# Patient Record
Sex: MALE | Race: WHITE | NOT HISPANIC OR LATINO | URBAN - METROPOLITAN AREA
[De-identification: names, ages, dates, MRNs, and addresses within clinical notes are randomized per-mention and may not be internally consistent; named-entity substitution may affect disease eponyms.]

---

## 2023-04-05 ENCOUNTER — EMERGENCY (EMERGENCY)
Facility: HOSPITAL | Age: 53
LOS: 0 days | Discharge: HOME | End: 2023-04-05
Attending: EMERGENCY MEDICINE
Payer: COMMERCIAL

## 2023-04-05 VITALS
DIASTOLIC BLOOD PRESSURE: 92 MMHG | OXYGEN SATURATION: 99 % | HEART RATE: 75 BPM | RESPIRATION RATE: 20 BRPM | HEIGHT: 70 IN | TEMPERATURE: 97 F | SYSTOLIC BLOOD PRESSURE: 131 MMHG | WEIGHT: 175.05 LBS

## 2023-04-05 DIAGNOSIS — M79.661 PAIN IN RIGHT LOWER LEG: ICD-10-CM

## 2023-04-05 DIAGNOSIS — M79.89 OTHER SPECIFIED SOFT TISSUE DISORDERS: ICD-10-CM

## 2023-04-05 PROCEDURE — 93970 EXTREMITY STUDY: CPT | Mod: 26

## 2023-04-05 PROCEDURE — 99284 EMERGENCY DEPT VISIT MOD MDM: CPT | Mod: 25

## 2023-04-05 PROCEDURE — 93970 EXTREMITY STUDY: CPT

## 2023-04-05 PROCEDURE — 99284 EMERGENCY DEPT VISIT MOD MDM: CPT

## 2023-04-05 NOTE — ED PROVIDER NOTE - CLINICAL SUMMARY MEDICAL DECISION MAKING FREE TEXT BOX
leg pain, vascular us obtained which did not reveal DVT. results discussed with patient and patient will fu with primary care physician as an outpatient.

## 2023-04-05 NOTE — ED PROVIDER NOTE - PATIENT PORTAL LINK FT
You can access the FollowMyHealth Patient Portal offered by [facility] by registering at the following website: [email or web address]. By joining [employer] [facility]’s FollowMyHealth portal, you will also be able to view your health information using other applications (apps) compatible with our system.

## 2023-04-05 NOTE — ED PROVIDER NOTE - OBJECTIVE STATEMENT
52-year-old male with no past medical history presented with right leg pain x1 week.  Pain is located in the back of his knee to the popliteal fossa region.  Denies any trauma.  No paresthesias or weakness.  No redness.  No fevers or chills.

## 2023-04-05 NOTE — ED PROVIDER NOTE - PHYSICAL EXAMINATION
VITAL SIGNS: I have reviewed nursing notes and confirm.  CONSTITUTIONAL: Patient is in no acute distress.  SKIN: Skin exam is warm and dry, no acute rash.  HEAD: Normocephalic; atraumatic.  EYES: PERRL, EOM intact; conjunctiva and sclera clear.  ENT: No nasal discharge; airway clear.   NECK: Supple; non tender.  CARD: S1, S2 normal; no murmurs, gallops, or rubs. Regular rate and rhythm.  RESP: Clear to auscultation bilaterally. No wheezes, rales or rhonchi.  ABD: Normal bowel sounds; soft; non-distended; non-tender.   EXT: Normal ROM. No edema. Mild tenderness to right popliteal fossa.  No calf tenderness.  Dorsalis pedis pulses 2+ bilaterally.  LYMPH: No acute cervical adenopathy.  NEURO: Alert, oriented. Grossly unremarkable. No focal deficits.  PSYCH: Cooperative, appropriate.

## 2023-04-05 NOTE — ED PROVIDER NOTE - ATTENDING APP SHARED VISIT CONTRIBUTION OF CARE
Right posterior leg pain with mild swelling no history of trauma occurring for 5 days no chest pain or shortness of breath on exam there is mild swelling noted to the posterior popliteal region distal pulses are intact cap refill is normal there is no discoloration Redd sign negative plan is to obtain DVT ultrasound

## 2024-02-20 ENCOUNTER — APPOINTMENT (OUTPATIENT)
Dept: ORTHOPEDIC SURGERY | Facility: CLINIC | Age: 54
End: 2024-02-20
Payer: COMMERCIAL

## 2024-02-20 PROBLEM — Z00.00 ENCOUNTER FOR PREVENTIVE HEALTH EXAMINATION: Status: ACTIVE | Noted: 2024-02-20

## 2024-02-20 PROCEDURE — 99203 OFFICE O/P NEW LOW 30 MIN: CPT | Mod: 25

## 2024-02-20 PROCEDURE — 20610 DRAIN/INJ JOINT/BURSA W/O US: CPT | Mod: LT

## 2024-02-20 PROCEDURE — 73562 X-RAY EXAM OF KNEE 3: CPT | Mod: 50

## 2024-02-27 NOTE — HISTORY OF PRESENT ILLNESS
[de-identified] : Patient here for evaluation left knee pain. Complains of joint line pain Positive mechanical symptoms and catching  NAD Left knee No skin breakdown Medial joint line ttp Positive isaías Negative lachman Negative varus/valgus instability ROM 0-130 Pain with forced extension and flexion NVI Compartments soft and NT  Xray reviewed and significant for left knee mild degenerative changes  mri left knee mmt, chondromalacia  plan i went over findings with pt explained the mri in detail op vs nonop explained r/b/a explained will try more cons tx for now due to pain, left knee injection fu in 6 weeks  Large Joint Injection was performed because of pain/rom. Anesthesia: ethyl chloride sprayed topically. Dexamethasone 2 cc of 4mg.   Lidocaine: 2 cc of 1%  Medication was injected in the left knee. Patient has tried OTC's including aspirin, Ibuprofen, Aleve etc or prescription NSAIDS, and/or exercises at home and/ or physical therapy without satisfactory response. After verbal consent using sterile preparation and technique. The risks, benefits, and alternatives to cortisone injection were explained in full to the patient. Risks outlined include but are not limited to infection, sepsis, bleeding, scarring, skin discoloration, temporary increase in pain, syncopal episode, failure to resolve symptoms, allergic reaction, symptom recurrence, and elevation of blood sugar in diabetics. Patient understood the risks. All questions were answered. After discussion of options, patient requested an injection. Oral informed consent was obtained and sterile prep was done of the injection site. Sterile technique was utilized for the procedure including the preparation of the solutions used for the injection. Patient tolerated the procedure well. Advised to ice the injection site this evening. Prep with alcohol locally to site. Sterile technique used. Diagnostic ultrasound was performed of the knee  to confirm.

## 2024-04-02 ENCOUNTER — APPOINTMENT (OUTPATIENT)
Dept: ORTHOPEDIC SURGERY | Facility: CLINIC | Age: 54
End: 2024-04-02
Payer: COMMERCIAL

## 2024-04-02 DIAGNOSIS — M25.562 PAIN IN LEFT KNEE: ICD-10-CM

## 2024-04-02 PROCEDURE — 99214 OFFICE O/P EST MOD 30 MIN: CPT

## 2024-04-02 NOTE — HISTORY OF PRESENT ILLNESS
[de-identified] : Patient here for evaluation left knee pain. Complains of joint line pain Positive mechanical symptoms and catching  had injection last visit  NAD Left knee No skin breakdown Medial joint line ttp Positive isaías Negative lachman Negative varus/valgus instability ROM 0-130 Pain with forced extension and flexion NVI Compartments soft and NT  Xray reviewed and significant for left knee mild degenerative changes  mri left knee mmt, chondromalacia  plan i went over findings with pt explained the mri in detail op vs nonop explained since pain is not getting better, will proceed with sxc  left knee arthroscopy, medial meniscectomy  Operative and nonoperative options discussed with patient. Surgical risks, benefits, and alternatives explained. Surgical risks include but are not exclusive to bleeding, infection, neurovascular damage, continued pain, stiffness, scarring, rsd, dvt/pe, potential failure of surgery that may require further surgery in the future. I went over incisions and rehabilitation. All questions answered.

## 2024-04-03 PROBLEM — M25.562 LEFT KNEE PAIN: Status: ACTIVE | Noted: 2024-02-20

## 2024-04-03 NOTE — HISTORY OF PRESENT ILLNESS
[de-identified] : Patient here for evaluation left knee pain. Complains of joint line pain Positive mechanical symptoms and catching  had injection last visit  cons tx has not helped  NAD Left knee No skin breakdown Medial joint line ttp Positive isaías Negative lachman Negative varus/valgus instability ROM 0-130 Pain with forced extension and flexion NVI Compartments soft and NT  Xray reviewed and significant for left knee mild degenerative changes  mri left knee mmt, chondromalacia  plan i went over findings with pt explained the mri in detail op vs nonop explained since pain is not getting better, will proceed with sxc  left knee arthroscopy, medial meniscectomy  Operative and nonoperative options discussed with patient. Surgical risks, benefits, and alternatives explained. Surgical risks include but are not exclusive to bleeding, infection, neurovascular damage, continued pain, stiffness, scarring, rsd, dvt/pe, potential failure of surgery that may require further surgery in the future. I went over incisions and rehabilitation. All questions answered.